# Patient Record
Sex: MALE | NOT HISPANIC OR LATINO
[De-identification: names, ages, dates, MRNs, and addresses within clinical notes are randomized per-mention and may not be internally consistent; named-entity substitution may affect disease eponyms.]

---

## 2017-09-07 ENCOUNTER — RESULT REVIEW (OUTPATIENT)
Age: 62
End: 2017-09-07

## 2018-12-24 ENCOUNTER — OUTPATIENT (OUTPATIENT)
Dept: OUTPATIENT SERVICES | Facility: HOSPITAL | Age: 63
LOS: 1 days | Discharge: HOME | End: 2018-12-24

## 2018-12-24 VITALS
SYSTOLIC BLOOD PRESSURE: 114 MMHG | RESPIRATION RATE: 17 BRPM | OXYGEN SATURATION: 97 % | DIASTOLIC BLOOD PRESSURE: 67 MMHG | HEART RATE: 81 BPM | WEIGHT: 152.56 LBS | TEMPERATURE: 98 F | HEIGHT: 70 IN

## 2018-12-24 DIAGNOSIS — S42.414A NONDISPLACED SIMPLE SUPRACONDYLAR FRACTURE WITHOUT INTERCONDYLAR FRACTURE OF RIGHT HUMERUS, INITIAL ENCOUNTER FOR CLOSED FRACTURE: ICD-10-CM

## 2018-12-24 DIAGNOSIS — Z01.818 ENCOUNTER FOR OTHER PREPROCEDURAL EXAMINATION: ICD-10-CM

## 2018-12-24 LAB
ANION GAP SERPL CALC-SCNC: 7 MMOL/L — SIGNIFICANT CHANGE UP (ref 5–17)
BUN SERPL-MCNC: 19 MG/DL — SIGNIFICANT CHANGE UP (ref 7–23)
CALCIUM SERPL-MCNC: 9.2 MG/DL — SIGNIFICANT CHANGE UP (ref 8.5–10.1)
CHLORIDE SERPL-SCNC: 105 MMOL/L — SIGNIFICANT CHANGE UP (ref 96–108)
CO2 SERPL-SCNC: 29 MMOL/L — SIGNIFICANT CHANGE UP (ref 22–31)
CREAT SERPL-MCNC: 1.29 MG/DL — SIGNIFICANT CHANGE UP (ref 0.5–1.3)
EOSINOPHIL NFR BLD AUTO: 3 % — SIGNIFICANT CHANGE UP (ref 0–6)
GLUCOSE SERPL-MCNC: 78 MG/DL — SIGNIFICANT CHANGE UP (ref 70–99)
HCT VFR BLD CALC: 47.9 % — SIGNIFICANT CHANGE UP (ref 39–50)
HGB BLD-MCNC: 15.6 G/DL — SIGNIFICANT CHANGE UP (ref 13–17)
LYMPHOCYTES # BLD AUTO: 25 % — SIGNIFICANT CHANGE UP (ref 13–44)
MCHC RBC-ENTMCNC: 29.1 PG — SIGNIFICANT CHANGE UP (ref 27–34)
MCHC RBC-ENTMCNC: 32.6 GM/DL — SIGNIFICANT CHANGE UP (ref 32–36)
MCV RBC AUTO: 89.4 FL — SIGNIFICANT CHANGE UP (ref 80–100)
MONOCYTES NFR BLD AUTO: 7 % — SIGNIFICANT CHANGE UP (ref 2–14)
NEUTROPHILS NFR BLD AUTO: 65 % — SIGNIFICANT CHANGE UP (ref 43–77)
NRBC # BLD: 0 /100 WBCS — SIGNIFICANT CHANGE UP (ref 0–0)
PLAT MORPH BLD: NORMAL — SIGNIFICANT CHANGE UP
PLATELET # BLD AUTO: 238 K/UL — SIGNIFICANT CHANGE UP (ref 150–400)
POTASSIUM SERPL-MCNC: 3.9 MMOL/L — SIGNIFICANT CHANGE UP (ref 3.5–5.3)
POTASSIUM SERPL-SCNC: 3.9 MMOL/L — SIGNIFICANT CHANGE UP (ref 3.5–5.3)
RBC # BLD: 5.36 M/UL — SIGNIFICANT CHANGE UP (ref 4.2–5.8)
RBC # FLD: 12.3 % — SIGNIFICANT CHANGE UP (ref 10.3–14.5)
RBC BLD AUTO: NORMAL — SIGNIFICANT CHANGE UP
SODIUM SERPL-SCNC: 141 MMOL/L — SIGNIFICANT CHANGE UP (ref 135–145)
WBC # BLD: 7.43 K/UL — SIGNIFICANT CHANGE UP (ref 3.8–10.5)
WBC # FLD AUTO: 7.43 K/UL — SIGNIFICANT CHANGE UP (ref 3.8–10.5)

## 2018-12-24 NOTE — H&P PST ADULT - PROBLEM SELECTOR PLAN 1
right shoulder arthroscopy  Pre-op instructions given by RN, patient verbalized understanding  Chlorhexidine wash instructions given
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
## 2018-12-24 NOTE — H&P PST ADULT - ATTENDING COMMENTS
To the best of my ability as an orthopaedic surgeon, I assert this.  there are no changes orthopaedically.  Otherwise as per anesthesia and or medicine.

## 2018-12-24 NOTE — H&P PST ADULT - PROBLEM SELECTOR PROBLEM 1
Nondisplaced simple supracondylar fracture without intercondylar fracture of right humerus, initial encounter for closed fracture
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
## 2018-12-26 ENCOUNTER — TRANSCRIPTION ENCOUNTER (OUTPATIENT)
Age: 63
End: 2018-12-26

## 2018-12-27 ENCOUNTER — OUTPATIENT (OUTPATIENT)
Dept: OUTPATIENT SERVICES | Facility: HOSPITAL | Age: 63
LOS: 1 days | Discharge: HOME | End: 2018-12-27
Payer: COMMERCIAL

## 2018-12-27 ENCOUNTER — RESULT REVIEW (OUTPATIENT)
Age: 63
End: 2018-12-27

## 2018-12-27 VITALS
DIASTOLIC BLOOD PRESSURE: 72 MMHG | TEMPERATURE: 97 F | SYSTOLIC BLOOD PRESSURE: 124 MMHG | HEART RATE: 76 BPM | RESPIRATION RATE: 18 BRPM | OXYGEN SATURATION: 99 %

## 2018-12-27 VITALS
TEMPERATURE: 99 F | WEIGHT: 154.98 LBS | OXYGEN SATURATION: 99 % | RESPIRATION RATE: 17 BRPM | HEART RATE: 77 BPM | SYSTOLIC BLOOD PRESSURE: 110 MMHG | DIASTOLIC BLOOD PRESSURE: 73 MMHG | HEIGHT: 70 IN

## 2018-12-27 DIAGNOSIS — M24.011 LOOSE BODY IN RIGHT SHOULDER: ICD-10-CM

## 2018-12-27 DIAGNOSIS — M65.811 OTHER SYNOVITIS AND TENOSYNOVITIS, RIGHT SHOULDER: ICD-10-CM
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
## 2018-12-27 DIAGNOSIS — S43.004A UNSPECIFIED DISLOCATION OF RIGHT SHOULDER JOINT, INITIAL ENCOUNTER: ICD-10-CM
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
## 2018-12-27 DIAGNOSIS — M25.511 PAIN IN RIGHT SHOULDER: ICD-10-CM

## 2018-12-27 DIAGNOSIS — S42.141A DISPLACED FRACTURE OF GLENOID CAVITY OF SCAPULA, RIGHT SHOULDER, INITIAL ENCOUNTER FOR CLOSED FRACTURE: ICD-10-CM
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
## 2018-12-27 PROCEDURE — 88304 TISSUE EXAM BY PATHOLOGIST: CPT | Mod: 26

## 2018-12-27 PROCEDURE — 88311 DECALCIFY TISSUE: CPT | Mod: 26

## 2018-12-27 RX ORDER — SODIUM CHLORIDE 9 MG/ML
1000 INJECTION, SOLUTION INTRAVENOUS
Refills: 0 | Status: DISCONTINUED | OUTPATIENT
Start: 2018-12-27 | End: 2018-12-27

## 2018-12-27 RX ORDER — HYDROMORPHONE HYDROCHLORIDE 2 MG/ML
0.5 INJECTION INTRAMUSCULAR; INTRAVENOUS; SUBCUTANEOUS
Refills: 0 | Status: DISCONTINUED | OUTPATIENT
Start: 2018-12-27 | End: 2018-12-27

## 2018-12-27 RX ORDER — ONDANSETRON 8 MG/1
4 TABLET, FILM COATED ORAL ONCE
Refills: 0 | Status: DISCONTINUED | OUTPATIENT
Start: 2018-12-27 | End: 2018-12-27

## 2018-12-27 RX ORDER — FENTANYL CITRATE 50 UG/ML
25 INJECTION INTRAVENOUS
Refills: 0 | Status: DISCONTINUED | OUTPATIENT
Start: 2018-12-27 | End: 2018-12-27

## 2018-12-27 RX ADMIN — SODIUM CHLORIDE 50 MILLILITER(S): 9 INJECTION, SOLUTION INTRAVENOUS at 13:16

## 2018-12-27 NOTE — BRIEF OPERATIVE NOTE - PROCEDURE
<<-----Click on this checkbox to enter Procedure Bankart repair of right shoulder  12/27/2018  S/P Right shoulder arthroscopic bankart repair, glenoid fracture repair  Active  LMICHEL

## 2018-12-27 NOTE — BRIEF OPERATIVE NOTE - OPERATION/FINDINGS
See op report    S/P Right shoulder arthroscopic bankart repair, glenoid fracture repair, removal of loose body

## 2019-04-25 PROBLEM — Z00.00 ENCOUNTER FOR PREVENTIVE HEALTH EXAMINATION: Status: ACTIVE | Noted: 2019-04-25

## 2021-06-21 ENCOUNTER — APPOINTMENT (OUTPATIENT)
Dept: DISASTER EMERGENCY | Facility: CLINIC | Age: 66
End: 2021-06-21

## 2021-06-21 DIAGNOSIS — Z01.818 ENCOUNTER FOR OTHER PREPROCEDURAL EXAMINATION: ICD-10-CM

## 2021-06-21 LAB — SARS-COV-2 N GENE NPH QL NAA+PROBE: NOT DETECTED

## 2022-11-19 ENCOUNTER — APPOINTMENT (OUTPATIENT)
Dept: ORTHOPEDIC SURGERY | Facility: CLINIC | Age: 67
End: 2022-11-19

## 2022-12-16 ENCOUNTER — APPOINTMENT (OUTPATIENT)
Dept: PAIN MANAGEMENT | Facility: CLINIC | Age: 67
End: 2022-12-16

## 2022-12-16 VITALS — BODY MASS INDEX: 27.32 KG/M2 | WEIGHT: 170 LBS | HEIGHT: 66 IN

## 2022-12-16 DIAGNOSIS — M75.42 IMPINGEMENT SYNDROME OF LEFT SHOULDER: ICD-10-CM

## 2022-12-16 DIAGNOSIS — Z78.9 OTHER SPECIFIED HEALTH STATUS: ICD-10-CM

## 2022-12-16 DIAGNOSIS — M25.511 PAIN IN RIGHT SHOULDER: ICD-10-CM

## 2022-12-16 DIAGNOSIS — G89.29 PAIN IN RIGHT SHOULDER: ICD-10-CM

## 2022-12-16 PROCEDURE — 20610 DRAIN/INJ JOINT/BURSA W/O US: CPT

## 2022-12-16 PROCEDURE — 99214 OFFICE O/P EST MOD 30 MIN: CPT | Mod: 25

## 2022-12-16 PROCEDURE — J3490M: CUSTOM

## 2022-12-16 NOTE — PROCEDURE
[FreeTextEntry3] : Cortisone shot into the sub acromial bursa is recommended because of the severity of symptoms.  The risks, benefits, and alternatives to cortisone injection were explained in full to the patient.  Risks outlined include but are not limited to infection, sepsis, bleeding, scarring, skin discoloration, temporary increase in pain, syncopal episode, failure to resolve symptoms, allergic reaction, symptom recurrence, and elevation of blood sugar in diabetics.  Patient understood the risks.  All questions were answered.  After discussion of options, patient requested an injection.  Oral informed consent was obtained and sterile prep was done of the injection site.  A mixture of 40 mg of Kenalog, 1 cc of 1% Lidocaine was sterilely prepared by me.  Sterile technique was used to introduce the mixture into the sub arcromail space..  Patient tolerated the procedure well.  Advised to ice the injection site this evening.\par

## 2022-12-16 NOTE — HISTORY OF PRESENT ILLNESS
[Neck] : neck [6] : 6 [Radiating] : radiating [Constant] : constant [de-identified] : pt states he is having pain in the neck , the pain goes to the right side  [] : no [FreeTextEntry7] : right side  [de-identified] : lifting , stretching

## 2023-11-08 ENCOUNTER — EMERGENCY (EMERGENCY)
Facility: HOSPITAL | Age: 68
LOS: 1 days | Discharge: HOME | End: 2023-11-08
Attending: EMERGENCY MEDICINE | Admitting: EMERGENCY MEDICINE
Payer: COMMERCIAL

## 2023-11-08 VITALS
OXYGEN SATURATION: 97 % | TEMPERATURE: 98 F | SYSTOLIC BLOOD PRESSURE: 152 MMHG | DIASTOLIC BLOOD PRESSURE: 84 MMHG | RESPIRATION RATE: 19 BRPM | HEART RATE: 70 BPM

## 2023-11-08 VITALS
DIASTOLIC BLOOD PRESSURE: 104 MMHG | SYSTOLIC BLOOD PRESSURE: 179 MMHG | RESPIRATION RATE: 20 BRPM | HEIGHT: 63 IN | TEMPERATURE: 97 F | OXYGEN SATURATION: 98 % | WEIGHT: 169.98 LBS | HEART RATE: 72 BPM

## 2023-11-08 PROCEDURE — 99284 EMERGENCY DEPT VISIT MOD MDM: CPT

## 2023-11-08 PROCEDURE — 99285 EMERGENCY DEPT VISIT HI MDM: CPT

## 2023-11-08 RX ORDER — IBUPROFEN 200 MG
600 TABLET ORAL ONCE
Refills: 0 | Status: COMPLETED | OUTPATIENT
Start: 2023-11-08 | End: 2023-11-08

## 2023-11-08 RX ADMIN — Medication 600 MILLIGRAM(S): at 18:20

## 2023-11-08 NOTE — ED PROVIDER NOTE - PATIENT PORTAL LINK FT
You can access the FollowMyHealth Patient Portal offered by [facility] by registering at the following website: [email or web address]. By joining [facility]’s FollowMyHealth portal, you will also be able to view your health information using other applications (apps) compatible with our system. You can access the FollowMyHealth Patient Portal offered by [facility] by registering at the following website: [email or web address]. By joining [facility]’s FollowMyHealth portal, you will also be able to view your health information using other applications (apps) compatible with our system. You can access the FollowMyHealth Patient Portal offered by [facility] by registering at the following website: [email or web address]. By joining [facility]’s FollowMyHealth portal, you will also be able to view your health information using other applications (apps) compatible with our system.

## 2023-11-08 NOTE — ED ADULT NURSE NOTE - CHIEF COMPLAINT QUOTE
I was at work when I was making soup and it splashed on me.  it hit my face and chest.  I work at [employer] and they applied silverdin cream but told me to go to er. I was at work when I was making soup and it splashed on me.  it hit my face and chest.  I work at [employer] and they applied silverdin cream but told me to go to er. I was at work when I was making soup and it splashed on me.  it hit my face and chest.  I work at [employer] and they applied silverdin cream but told me to go to er.
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
## 2023-11-08 NOTE — ED PROVIDER NOTE - NS ED ATTENDING STATEMENT MOD
This was a shared visit with the SUSIE. I reviewed and verified the documentation and independently performed the documented:

## 2023-11-08 NOTE — ED PROVIDER NOTE - DIFFERENTIAL DIAGNOSIS
Patient advised not to shave until this clears.  To apply bacitracin to the facial neck region Silvadene to the chest wall.  He can take Motrin as needed for pain.  Wound care follow-up provided. Differential Diagnosis

## 2023-11-08 NOTE — ED ADULT NURSE NOTE - NSFALLUNIVINTERV_ED_ALL_ED
Bed/Stretcher in lowest position, wheels locked, appropriate side rails in place/Call bell, personal items and telephone in reach/Instruct patient to call for assistance before getting out of bed/chair/stretcher/Non-slip footwear applied when patient is off stretcher/[place] to call system/Physically safe environment - no spills, clutter or unnecessary equipment/Purposeful proactive rounding/Room/bathroom lighting operational, light cord in reach Bed/Stretcher in lowest position, wheels locked, appropriate side rails in place/Call bell, personal items and telephone in reach/Instruct patient to call for assistance before getting out of bed/chair/stretcher/Non-slip footwear applied when patient is off stretcher/[place] to call system/Physically safe environment - no spills, clutter or unnecessary equipment/Purposeful proactive rounding/Room/bathroom lighting operational, light cord in reach Bed/Stretcher in lowest position, wheels locked, appropriate side rails in place/Call bell, personal items and telephone in reach/Instruct patient to call for assistance before getting out of bed/chair/stretcher/Non-slip footwear applied when patient is off stretcher/[place] to call system/Physically safe environment - no spills, clutter or unnecessary equipment/Purposeful proactive rounding/Room/bathroom lighting operational, light cord in reach

## 2023-11-08 NOTE — ED ADULT TRIAGE NOTE - CHIEF COMPLAINT QUOTE
I was at work when I was making soup and it splashed on me.  it hit my face and chest.  I work at [employer] and they applied silverdin cream but told me to go to er. I was at work when I was making soup and it splashed on me.  it hit my face and chest.  I work at [employer] and they applied silverdin cream but told me to go to er. I was at work when I was making soup and it splashed on me.  it hit my face and chest.  I work at [employer] and they applied silverdin cream but told me to go to er.

## 2023-11-08 NOTE — ED PROVIDER NOTE - NSFOLLOWUPCLINICS_GEN_ALL_ED_FT
[facility]  Wound Care  [address] [place] [address]  Phone: [phone]  Fax:      [facility]  Wound Care  [address] [place] [address]  Phone: [phone]  Fax:      [facility]  Wound Care  [address] [place] [address]  Phone: [phone]  Fax:
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
## 2023-11-08 NOTE — ED PROVIDER NOTE - NSFOLLOWUPINSTRUCTIONS_ED_ALL_ED_FT
Bacitracin to neck.  Silvadene to chest.  Follow up with wound care.  Return for worsening or concerning symptoms.          A burn is an injury to the skin or the tissues under the skin that is caused by a fire, hot liquid, chemical, or electricity. There are three types of burns:  First degree. These burns may cause the skin to be red and slightly swollen. These burns do not blister or scar.  Second degree. These burns are very painful and cause the skin to be very red. The skin may also swell, leak fluid, look shiny, and develop blisters.  Third degree. These burns cause permanent damage. They either turn the skin white or black and make it look charred, dry, and leathery. These burns may not be painful due to damage to the nerve endings.  Treatment for your burn will depend on the type of burn you have. Taking care of your burn properly can help to prevent pain and infection. It can also help the burn heal more quickly.    How to care for a first-degree burn  Right after a burn:    Rinse or soak the burn under cool water for 5 minutes or more. Do not put ice on your burn. This can cause more damage.  Apply a cool, clean, wet cloth (cool compress) to your skin. This may help with pain.  Put lotion or gel with aloe vera on your skin. This may help soothe the burn.  Caring for the burn    Follow instructions from your health care provider about cleaning and caring for the burn. This may include:  Using mild soap and water to clean the area.  Using a clean cloth to pat the burned area dry after cleaning it. Do not rub or scrub the burn.  Applying lotion or gel with aloe vera to your skin.  How to care for a second-degree burn  Right after a burn:    Rinse or soak the burn under cool water. Do this for 5 to 10 minutes. Do not put ice on your burn. This can cause more damage.  Remove any jewelry near the burned area.  Lightly cover the burn with a clean cloth.  Caring for the burn    Raise (elevate) the injured area above the level of your heart while sitting or lying down.  Follow instructions from your health care provider about cleaning and caring for the burn. This may include:  Cleaning or rinsing out (irrigating) the burned area.  Putting a cream or ointment on the burn.  Placing a germ-free (sterile) dressing over the burn. A dressing is a material that is placed over a burn to help it heal.  How to care for a third-degree burn  Right after a burn:    Lightly cover the burn with a clean, dry cloth.  Seek treatment right away if you have this kind of burn. You may:  Require admission to the hospital.  Be treated with surgery to remove damaged tissue or to place a skin graft to cover the damaged area.  Be given IV fluids to keep you hydrated.  Caring for the burn    Follow instructions from your health care provider about cleaning and caring for the burn. This may include:  Cleaning or rinsing out (irrigating) the burned area.  Putting a cream or ointment on the burn.  Placing a sterile dressing in the burned area (packing).  Placing a sterile dressing over the burn.  Other instructions    Elevate the injured area above the level of your heart while sitting or lying down.  Wear splints or immobilizers as instructed by your health care provider.  Rest as told by your health care provider. Do not participate in sports or other physical activities until your health care provider approves.  How to prevent infection when caring for a burn    Take these steps to prevent infection:  Wash your hands with soap and water for at least 20 seconds before and after burn care. If soap and water are not available, use hand .  Wear clean or sterile gloves as directed by your health care provider.  Do not put butter, oil, toothpaste, or other home remedies on the burn.  Do not scratch or pick at the burn.  Do not break any blisters.  Do not peel the skin.  Do not rub your burn, even when you are cleaning it.  Check your burn every day for these signs of infection:  More redness, swelling, or pain.  Warmth.  Pus or a bad smell.  Red streaks around the burn.  Follow these instructions at home    Medicines    Take over-the-counter and prescription medicines only as told by your health care provider.  If you were prescribed an antibiotic medicine, take or apply it as told by your health care provider. Do not stop using the antibiotic even if your condition improves.  Your health care provider may recommend taking over-the-counter or prescription pain medicine before changing your dressing.  General instructions    Protect your burn from the sun.  Drink enough fluid to keep your urine pale yellow.  Do not use any products that contain nicotine or tobacco, such as cigarettes, e-cigarettes, and chewing tobacco. These can delay healing. If you need help quitting, ask your health care provider.  Keep all follow-up visits as told by your health care provider. This is important.  Contact a health care provider if:  Your condition does not improve.  Your condition gets worse.  You have a fever or chills.  Your burn feels warm to the touch.  You have more redness, swelling, or pain at the site of the burn.  Your burn changes in appearance or develops black or red spots.  You have pain that is not controlled with medicine.  Get help right away if you have:  More fluid, blood, or pus coming from your burn.  Red streaks near the burn.  Severe pain.  Summary  There are three types of burns. They are first degree, second degree, and third degree. The most severe type of burn is a third-degree burn which must be treated right away.  Treatment for your burn will depend on the type of burn you have.  Do not put butter, oil, toothpaste, or other home remedies on the burn. This can cause more damage to the tissue.  Follow instructions from your health care provider about how to clean and take care of your burn.  This information is not intended to replace advice given to you by your health care provider. Make sure you discuss any questions you have with your health care provider.

## 2024-01-11 NOTE — BRIEF OPERATIVE NOTE - TYPE OF ANESTHESIA
[facility] Cardiology Refill Guideline reviewed.  Medication meets criteria for refill.    Received refill request for:  Lisinopril  Last OV was: 9/14/2022  Labs/EKG: last BMP 4/25/2023  F/U scheduled: 2/9/2024 with Dr. Perez  New script sent to: [facility]    Recent pharmacy change.  Pt requested 30 day script sent to [facility].      .    General